# Patient Record
Sex: FEMALE | URBAN - METROPOLITAN AREA
[De-identification: names, ages, dates, MRNs, and addresses within clinical notes are randomized per-mention and may not be internally consistent; named-entity substitution may affect disease eponyms.]

---

## 2023-04-14 ENCOUNTER — TELEPHONE (OUTPATIENT)
Dept: PERINATAL CARE | Facility: HOSPITAL | Age: 23
End: 2023-04-14

## 2023-04-14 NOTE — TELEPHONE ENCOUNTER
PT had order faxed over from Dr. Faraz Meyers's office in [facility] 1690 for [facility] for a [facility] [place]. PT has never been seen with [facility] before. Called over to her OB office to have prenatals faxed over and was met by the woman on the phone asking why the patient was scheduling with us and not Maribell Jain. I let her know I had no clue but that I had let the patient know she called in to [facility] to schedule and that since I had to set her up in Epic with us I have no info for her aside from what the PT gave me on the phone. Once we got the fax it looks like we might need to do a Level 2 and need to contact the office about whether we will be managing her diabetes. We will reach out to the office Monday to get clarity on what the patient needs prior to calling the PT back to schedule.